# Patient Record
Sex: MALE | Race: WHITE | Employment: FULL TIME | URBAN - METROPOLITAN AREA
[De-identification: names, ages, dates, MRNs, and addresses within clinical notes are randomized per-mention and may not be internally consistent; named-entity substitution may affect disease eponyms.]

---

## 2020-11-02 ENCOUNTER — TRANSCRIBE ORDER (OUTPATIENT)
Dept: SCHEDULING | Age: 55
End: 2020-11-02

## 2020-11-02 DIAGNOSIS — I10 MALIGNANT HYPERTENSION: ICD-10-CM

## 2020-11-02 DIAGNOSIS — I10 ESSENTIAL HYPERTENSION, MALIGNANT: Primary | ICD-10-CM

## 2020-11-02 DIAGNOSIS — M54.2 NECK PAIN: ICD-10-CM

## 2020-11-02 DIAGNOSIS — E03.9 ACQUIRED HYPOTHYROIDISM: Primary | ICD-10-CM

## 2020-11-02 DIAGNOSIS — M54.2 NECK ACHE: ICD-10-CM

## 2021-01-26 ENCOUNTER — TRANSCRIBE ORDER (OUTPATIENT)
Dept: SCHEDULING | Age: 56
End: 2021-01-26

## 2021-01-26 DIAGNOSIS — M54.2 NECK PAIN: ICD-10-CM

## 2021-01-26 DIAGNOSIS — E03.9 PRIMARY HYPOTHYROIDISM: Primary | ICD-10-CM

## 2021-01-26 DIAGNOSIS — I10 HYPERTENSION: ICD-10-CM

## 2021-02-03 ENCOUNTER — HOSPITAL ENCOUNTER (OUTPATIENT)
Dept: ULTRASOUND IMAGING | Age: 56
Discharge: HOME | End: 2021-02-03
Attending: SPECIALIST
Payer: COMMERCIAL

## 2021-02-03 ENCOUNTER — HOSPITAL ENCOUNTER (OUTPATIENT)
Dept: VASCULAR SURGERY | Age: 56
Discharge: HOME | End: 2021-02-03
Attending: SPECIALIST
Payer: COMMERCIAL

## 2021-02-03 DIAGNOSIS — I10 ESSENTIAL HYPERTENSION, MALIGNANT: ICD-10-CM

## 2021-02-03 DIAGNOSIS — I65.23 BILATERAL CAROTID ARTERY STENOSIS: ICD-10-CM

## 2021-02-03 DIAGNOSIS — M54.2 NECK ACHE: ICD-10-CM

## 2021-02-03 DIAGNOSIS — M54.2 NECK PAIN: ICD-10-CM

## 2021-02-03 DIAGNOSIS — I10 MALIGNANT HYPERTENSION: ICD-10-CM

## 2021-02-03 DIAGNOSIS — E03.9 ACQUIRED HYPOTHYROIDISM: ICD-10-CM

## 2021-02-03 LAB
LEFT CCA DIST DIAS: 16.3 CM/S
LEFT CCA DIST SYS: 72.9 CM/S
LEFT CCA PROX DIAS: 23.6 CM/S
LEFT CCA PROX SYS: 114.9 CM/S
LEFT ECA DIAS: 10.8 CM/S
LEFT ECA SYS: 83.8 CM/S
LEFT ICA DIST DIAS: 18.1 CM/S
LEFT ICA DIST SYS: 60.1 CM/S
LEFT ICA MID DIAS: 18.1 CM/S
LEFT ICA MID SYS: 72.9 CM/S
LEFT ICA PROX DIAS: 18.1 CM/S
LEFT ICA PROX SYS: 65.6 CM/S
LEFT ICA/CCA SYS: 1
LEFT SUBCLAVIAN DIAS: 0 CM/S
LEFT SUBCLAVIAN SYS: 81.2 CM/S
LEFT VERTEBRAL DIAS: 10.8 CM/S
LEFT VERTEBRAL SYS: 45.5 CM/S
RIGHT CCA DIST DIAS: 12.2 CM/S
RIGHT CCA DIST SYS: 60.1 CM/S
RIGHT CCA PROX DIAS: 17.9 CM/S
RIGHT CCA PROX SYS: 131.8 CM/S
RIGHT ECA DIAS: 7.1 CM/S
RIGHT ECA SYS: 116.7 CM/S
RIGHT ICA DIST DIAS: 22 CM/S
RIGHT ICA DIST SYS: 67.4 CM/S
RIGHT ICA MID DIAS: 23.2 CM/S
RIGHT ICA MID SYS: 76 CM/S
RIGHT ICA PROX DIAS: 22 CM/S
RIGHT ICA PROX SYS: 68.6 CM/S
RIGHT ICA/CCA SYS: 1.3
RIGHT SUBCLAVIAN DIAS: 0 CM/S
RIGHT SUBCLAVIAN SYS: 173.3 CM/S
RIGHT VERTEBRAL DIAS: 7.6 CM/S
RIGHT VERTEBRAL SYS: 37.3 CM/S

## 2021-02-03 PROCEDURE — 93880 EXTRACRANIAL BILAT STUDY: CPT

## 2021-02-03 PROCEDURE — 76536 US EXAM OF HEAD AND NECK: CPT

## 2022-03-20 PROBLEM — I65.23 BILATERAL CAROTID ARTERY STENOSIS: Status: ACTIVE | Noted: 2021-02-03

## 2022-07-11 ENCOUNTER — OFFICE VISIT (OUTPATIENT)
Dept: ORTHOPEDIC SURGERY | Age: 57
End: 2022-07-11
Payer: COMMERCIAL

## 2022-07-11 VITALS — BODY MASS INDEX: 31.14 KG/M2 | WEIGHT: 235 LBS | HEIGHT: 73 IN

## 2022-07-11 DIAGNOSIS — M54.2 NECK PAIN: Primary | ICD-10-CM

## 2022-07-11 PROCEDURE — 99204 OFFICE O/P NEW MOD 45 MIN: CPT | Performed by: STUDENT IN AN ORGANIZED HEALTH CARE EDUCATION/TRAINING PROGRAM

## 2022-07-11 RX ORDER — ROSUVASTATIN CALCIUM 5 MG/1
TABLET, COATED ORAL
COMMUNITY
Start: 2022-05-29

## 2022-07-11 NOTE — PROGRESS NOTES
Aime Cruz (: 1965) is a 62 y.o. male here for evaluation of the following chief complaint(s):  Neck Pain (Left Shoulder)       ASSESSMENT/PLAN:  Below is the assessment and plan developed based on review of pertinent history, physical exam, labs, studies, and medications. 1. Neck pain  -     XR SPINE CERV 4 OR 5 V; Future      Return if symptoms worsen or fail to improve. SUBJECTIVE/OBJECTIVE:  HPI    Patient with 1 month history of neck pain with referred pain into his left shoulder. No antecedent trauma. He has some questionable tingling in the left forearm but nothing terribly bothersome. His pain has been getting better over the last 1 to 2 days. He has taken Aleve and ibuprofen as needed. Pain is worse with all motion especially rotation, looking over her shoulders in the car to transition lanes. Better with rest.  No weakness or other red flag symptoms. Relevant history of type 2 diabetes not insulin-dependent. Social drinker. Non-smoker. Chief Complaint   Patient presents with    Neck Pain     Left Shoulder     Current Outpatient Medications   Medication Sig    rosuvastatin (CRESTOR) 5 mg tablet TAKE 1 TABLET BY MOUTH 3 TIMES A WEEK     No current facility-administered medications for this visit. History reviewed. No pertinent past medical history. History reviewed. No pertinent surgical history. History reviewed. No pertinent family history.   Social History     Tobacco Use    Smoking status: Never Smoker    Smokeless tobacco: Never Used   Substance Use Topics    Alcohol use: Not on file    Drug use: Not on file      Social History     Tobacco Use   Smoking Status Never Smoker   Smokeless Tobacco Never Used     Social History     Substance and Sexual Activity   Alcohol Use None       Review of Systems  Red flag symptoms: None  Bowel/Bladder: Denies  Weakness: None  Sensory disturbance: None  Ambulation: Normal    Ht 6' 1\" (1.854 m)   Wt 235 lb (106.6 kg) BMI 31.00 kg/m²    Physical Exam    LOWER EXTREMITIES:  Normal neurovascular exam throughout    UPPER EXTREMITIES:  Motor: 5/5 in all myotomes  Sensory: Intact to light touch in all dermatomes  Reflexes: Diminished but symmetric C5-C7  Pathological reflexes: Negative Tomlin  Special tests: N/A    NECK/BACK:  Integumentary: Normal  Palpation/ROM: Some mild tenderness in the cervical paraspinal musculature and trapezius especially on the left side. Decreased range of motion in all planes. IMAGING:    XR Results (most recent):  Results from Appointment encounter on 07/11/22    XR SPINE CERV 4 OR 5 V    Narrative  4 view cervical spine demonstrating decreased normal lordosis. Anterior bridging osteophytes present at C4/C5, C5/C6, and C6/C7. No instability noted on dynamic films. Decreased motion in the sagittal plane based on dynamic films. Disc height preserved throughout. PATHOLOGY/LABS: N/A        An electronic signature was used to authenticate this note.   -- Essie Kiran, DO

## 2022-07-11 NOTE — PROGRESS NOTES
1. Have you been to the ER, urgent care clinic since your last visit? Hospitalized since your last visit? No    2. Have you seen or consulted any other health care providers outside of the [address] [place] [address] since your last visit? Include any pap smears or colon screening.  No    Chief Complaint   Patient presents with    Neck Pain     Left Shoulder

## 2024-07-05 ENCOUNTER — APPOINTMENT (OUTPATIENT)
Facility: HOSPITAL | Age: 59
End: 2024-07-05
Payer: COMMERCIAL

## 2024-07-05 ENCOUNTER — HOSPITAL ENCOUNTER (EMERGENCY)
Facility: HOSPITAL | Age: 59
Discharge: HOME | End: 2024-07-05
Attending: STUDENT IN AN ORGANIZED HEALTH CARE EDUCATION/TRAINING PROGRAM
Payer: COMMERCIAL

## 2024-07-05 VITALS
OXYGEN SATURATION: 95 % | HEART RATE: 72 BPM | BODY MASS INDEX: 31.81 KG/M2 | TEMPERATURE: 98.3 F | WEIGHT: 240 LBS | HEIGHT: 73 IN | SYSTOLIC BLOOD PRESSURE: 132 MMHG | DIASTOLIC BLOOD PRESSURE: 73 MMHG | RESPIRATION RATE: 18 BRPM

## 2024-07-05 DIAGNOSIS — S09.90XA INJURY OF HEAD, INITIAL ENCOUNTER: ICD-10-CM

## 2024-07-05 DIAGNOSIS — M25.552 LEFT HIP PAIN: Primary | ICD-10-CM

## 2024-07-05 PROCEDURE — 72125 CT NECK SPINE W/O DYE: CPT

## 2024-07-05 PROCEDURE — 74176 CT ABD & PELVIS W/O CONTRAST: CPT

## 2024-07-05 PROCEDURE — 99284 EMERGENCY DEPT VISIT MOD MDM: CPT

## 2024-07-05 PROCEDURE — 70450 CT HEAD/BRAIN W/O DYE: CPT

## 2024-07-05 PROCEDURE — 71250 CT THORAX DX C-: CPT

## 2024-07-05 PROCEDURE — 6370000000 HC RX 637 (ALT 250 FOR IP): Performed by: STUDENT IN AN ORGANIZED HEALTH CARE EDUCATION/TRAINING PROGRAM

## 2024-07-05 PROCEDURE — 73502 X-RAY EXAM HIP UNI 2-3 VIEWS: CPT

## 2024-07-05 RX ORDER — HYDROCODONE BITARTRATE AND ACETAMINOPHEN 5; 325 MG/1; MG/1
1 TABLET ORAL EVERY 6 HOURS PRN
Qty: 12 TABLET | Refills: 0 | Status: SHIPPED | OUTPATIENT
Start: 2024-07-05 | End: 2024-07-08

## 2024-07-05 RX ORDER — ACETAMINOPHEN 325 MG/1
650 TABLET ORAL
Status: COMPLETED | OUTPATIENT
Start: 2024-07-05 | End: 2024-07-05

## 2024-07-05 RX ADMIN — ACETAMINOPHEN 650 MG: 325 TABLET ORAL at 10:01

## 2024-07-05 ASSESSMENT — PAIN DESCRIPTION - DESCRIPTORS
DESCRIPTORS: DISCOMFORT
DESCRIPTORS: NAGGING;SHARP

## 2024-07-05 ASSESSMENT — ENCOUNTER SYMPTOMS
SHORTNESS OF BREATH: 0
ABDOMINAL PAIN: 0

## 2024-07-05 ASSESSMENT — PAIN DESCRIPTION - LOCATION
LOCATION: HIP
LOCATION: HIP

## 2024-07-05 ASSESSMENT — PAIN DESCRIPTION - ORIENTATION
ORIENTATION: LEFT
ORIENTATION: LEFT

## 2024-07-05 ASSESSMENT — PAIN SCALES - GENERAL
PAINLEVEL_OUTOF10: 6
PAINLEVEL_OUTOF10: 8

## 2024-07-05 NOTE — ED PROVIDER NOTES
[facility]  EMERGENCY DEPARTMENT ENCOUNTER      Pt Name: Zoran Warner  MRN: [id number]  Birthdate 1965  Date of evaluation: 7/5/2024  Provider: Raffi Gonzales DO    CHIEF COMPLAINT       Chief Complaint   Patient presents with    Fall    Head Injury    Hip Pain         HISTORY OF PRESENT ILLNESS   (Location/Symptom, Timing/Onset, Context/Setting, Quality, Duration, Modifying Factors, Severity)  Note limiting factors.   59-year-old male presents ED for evaluation of left hip pain.  Patient reports that he had a ground-level fall yesterday evening.  Was outside and turned and slipped fell landed on his left hip, also struck his head.  Has some abrasions to the left forehead no loss conscious no anticoagulants.  Patient got nauseous.  Also has some left hip pain reports he cannot put any weight on it.  No chest pain back pain or abdominal pain.  Has not taken anything for pain.            Review of External Medical Records:     Nursing Notes were reviewed.    REVIEW OF SYSTEMS    (2-9 systems for level 4, 10 or more for level 5)     Review of Systems   Respiratory:  Negative for shortness of breath.    Cardiovascular:  Negative for chest pain.   Gastrointestinal:  Negative for abdominal pain.   Musculoskeletal:         Left hip pain   Neurological:  Positive for headaches. Negative for weakness and numbness.       Except as noted above the remainder of the review of systems was reviewed and negative.       PAST MEDICAL HISTORY   No past medical history on file.      SURGICAL HISTORY     No past surgical history on file.      CURRENT MEDICATIONS       Discharge Medication List as of 7/5/2024 12:07 PM        CONTINUE these medications which have NOT CHANGED    Details   rosuvastatin (CRESTOR) 5 MG tablet TAKE 1 TABLET BY MOUTH 3 TIMES A [facility]             ALLERGIES     Patient has no known allergies.    FAMILY HISTORY     No family history on file.       SOCIAL HISTORY       Social  cardiologist.        RADIOLOGY:   Non-plain film images such as CT, Ultrasound and MRI are read by the radiologist. Plain radiographic images are visualized and preliminarily interpreted by the emergency physician with the below findings:        Interpretation per the Radiologist below, if available at the time of this note:    XR HIP BILATERAL W AP PELVIS (2 VIEWS)    (Results Pending)   CT HEAD WO CONTRAST    (Results Pending)   CT CERVICAL SPINE WO CONTRAST    (Results Pending)        LABS:  Labs Reviewed - No data to display    All other labs were within normal range or not returned as of this dictation.    EMERGENCY DEPARTMENT COURSE and DIFFERENTIAL DIAGNOSIS/MDM:   Vitals:    Vitals:    07/05/24 0900 07/05/24 0905 07/05/24 0910   BP:  136/71    Pulse: 77     Resp: 16     Temp: 98.3 °F (36.8 °C)     TempSrc: Tympanic     SpO2: 94% 94% 95%   Weight: 108.9 kg (240 lb)     Height: 1.854 m (6' 1\")             Medical Decision Making  Amount and/or Complexity of Data Reviewed  Radiology: ordered.    Risk  OTC drugs.            REASSESSMENT            CONSULTS:  None    PROCEDURES:  Unless otherwise noted below, none     Procedures      FINAL IMPRESSION    No diagnosis found.      DISPOSITION/PLAN   DISPOSITION        PATIENT REFERRED TO:  No follow-up provider specified.    DISCHARGE MEDICATIONS:  New Prescriptions    No medications on file         (Please note that portions of this note were completed with a voice recognition program.  Efforts were made to edit the dictations but occasionally words are mis-transcribed.)    Raffi Gonzales DO (electronically signed)  Emergency Attending Physician / Physician Assistant / Nurse Practitioner

## 2024-07-05 NOTE — ED TRIAGE NOTES
Patient arrives with c/o fall last night. Patient reports feet got from under him and landed on his left hip. Patient hit his head on the gravel. Patient has an abrasion to the top of the left side of his forehead. No blood thinners, or ASA. Patient got nauseated. Patient denies dizziness, change in vision.  Denies taking anything for pain.